# Patient Record
Sex: MALE | Race: WHITE | NOT HISPANIC OR LATINO | Employment: FULL TIME | URBAN - METROPOLITAN AREA
[De-identification: names, ages, dates, MRNs, and addresses within clinical notes are randomized per-mention and may not be internally consistent; named-entity substitution may affect disease eponyms.]

---

## 2021-07-01 ENCOUNTER — PRE-ADMISSION TESTING (OUTPATIENT)
Dept: PREADMISSION TESTING | Facility: HOSPITAL | Age: 51
End: 2021-07-01

## 2021-07-01 VITALS — WEIGHT: 276.6 LBS | BODY MASS INDEX: 37.46 KG/M2 | HEIGHT: 72 IN

## 2021-07-01 LAB
ALBUMIN SERPL-MCNC: 4.4 G/DL (ref 3.5–5.2)
ALBUMIN/GLOB SERPL: 1.6 G/DL
ALP SERPL-CCNC: 51 U/L (ref 39–117)
ALT SERPL W P-5'-P-CCNC: 55 U/L (ref 1–41)
ANION GAP SERPL CALCULATED.3IONS-SCNC: 10 MMOL/L (ref 5–15)
APTT PPP: 26 SECONDS (ref 22–39)
AST SERPL-CCNC: 50 U/L (ref 1–40)
BASOPHILS # BLD AUTO: 0.05 10*3/MM3 (ref 0–0.2)
BASOPHILS NFR BLD AUTO: 0.7 % (ref 0–1.5)
BILIRUB SERPL-MCNC: 0.5 MG/DL (ref 0–1.2)
BUN SERPL-MCNC: 15 MG/DL (ref 6–20)
BUN/CREAT SERPL: 15.3 (ref 7–25)
CALCIUM SPEC-SCNC: 9.4 MG/DL (ref 8.6–10.5)
CHLORIDE SERPL-SCNC: 102 MMOL/L (ref 98–107)
CO2 SERPL-SCNC: 26 MMOL/L (ref 22–29)
CREAT SERPL-MCNC: 0.98 MG/DL (ref 0.76–1.27)
CRP SERPL-MCNC: <0.3 MG/DL (ref 0–0.5)
DEPRECATED RDW RBC AUTO: 41.7 FL (ref 37–54)
EOSINOPHIL # BLD AUTO: 0.07 10*3/MM3 (ref 0–0.4)
EOSINOPHIL NFR BLD AUTO: 1 % (ref 0.3–6.2)
ERYTHROCYTE [DISTWIDTH] IN BLOOD BY AUTOMATED COUNT: 12.1 % (ref 12.3–15.4)
ERYTHROCYTE [SEDIMENTATION RATE] IN BLOOD: 5 MM/HR (ref 0–15)
GFR SERPL CREATININE-BSD FRML MDRD: 81 ML/MIN/1.73
GLOBULIN UR ELPH-MCNC: 2.7 GM/DL
GLUCOSE SERPL-MCNC: 92 MG/DL (ref 65–99)
HBA1C MFR BLD: 5 % (ref 4.8–5.6)
HCT VFR BLD AUTO: 44 % (ref 37.5–51)
HGB BLD-MCNC: 15.6 G/DL (ref 13–17.7)
IMM GRANULOCYTES # BLD AUTO: 0.06 10*3/MM3 (ref 0–0.05)
IMM GRANULOCYTES NFR BLD AUTO: 0.8 % (ref 0–0.5)
INR PPP: 0.96 (ref 0.85–1.16)
LYMPHOCYTES # BLD AUTO: 2.34 10*3/MM3 (ref 0.7–3.1)
LYMPHOCYTES NFR BLD AUTO: 32 % (ref 19.6–45.3)
MCH RBC QN AUTO: 33.8 PG (ref 26.6–33)
MCHC RBC AUTO-ENTMCNC: 35.5 G/DL (ref 31.5–35.7)
MCV RBC AUTO: 95.4 FL (ref 79–97)
MONOCYTES # BLD AUTO: 0.96 10*3/MM3 (ref 0.1–0.9)
MONOCYTES NFR BLD AUTO: 13.1 % (ref 5–12)
NEUTROPHILS NFR BLD AUTO: 3.83 10*3/MM3 (ref 1.7–7)
NEUTROPHILS NFR BLD AUTO: 52.4 % (ref 42.7–76)
NRBC BLD AUTO-RTO: 0 /100 WBC (ref 0–0.2)
PLATELET # BLD AUTO: 211 10*3/MM3 (ref 140–450)
PMV BLD AUTO: 8.9 FL (ref 6–12)
POTASSIUM SERPL-SCNC: 4.2 MMOL/L (ref 3.5–5.2)
PROT SERPL-MCNC: 7.1 G/DL (ref 6–8.5)
PROTHROMBIN TIME: 12.5 SECONDS (ref 11.4–14.4)
QT INTERVAL: 406 MS
QTC INTERVAL: 422 MS
RBC # BLD AUTO: 4.61 10*6/MM3 (ref 4.14–5.8)
SODIUM SERPL-SCNC: 138 MMOL/L (ref 136–145)
WBC # BLD AUTO: 7.31 10*3/MM3 (ref 3.4–10.8)

## 2021-07-01 PROCEDURE — 85730 THROMBOPLASTIN TIME PARTIAL: CPT

## 2021-07-01 PROCEDURE — 82985 ASSAY OF GLYCATED PROTEIN: CPT

## 2021-07-01 PROCEDURE — 93010 ELECTROCARDIOGRAM REPORT: CPT | Performed by: INTERNAL MEDICINE

## 2021-07-01 PROCEDURE — 86140 C-REACTIVE PROTEIN: CPT

## 2021-07-01 PROCEDURE — G0480 DRUG TEST DEF 1-7 CLASSES: HCPCS

## 2021-07-01 PROCEDURE — 85610 PROTHROMBIN TIME: CPT

## 2021-07-01 PROCEDURE — 85025 COMPLETE CBC W/AUTO DIFF WBC: CPT

## 2021-07-01 PROCEDURE — 83036 HEMOGLOBIN GLYCOSYLATED A1C: CPT

## 2021-07-01 PROCEDURE — 80053 COMPREHEN METABOLIC PANEL: CPT

## 2021-07-01 PROCEDURE — 87081 CULTURE SCREEN ONLY: CPT

## 2021-07-01 PROCEDURE — 85652 RBC SED RATE AUTOMATED: CPT

## 2021-07-01 PROCEDURE — 82652 VIT D 1 25-DIHYDROXY: CPT

## 2021-07-01 PROCEDURE — 36415 COLL VENOUS BLD VENIPUNCTURE: CPT

## 2021-07-01 PROCEDURE — 93005 ELECTROCARDIOGRAM TRACING: CPT

## 2021-07-01 RX ORDER — ASPIRIN 81 MG/1
81 TABLET, CHEWABLE ORAL DAILY
COMMUNITY

## 2021-07-01 RX ORDER — MELOXICAM 15 MG/1
15 TABLET ORAL DAILY
COMMUNITY

## 2021-07-01 RX ORDER — CETIRIZINE HYDROCHLORIDE 10 MG/1
10 TABLET ORAL DAILY
COMMUNITY

## 2021-07-01 RX ORDER — LISINOPRIL 20 MG/1
20 TABLET ORAL DAILY
COMMUNITY

## 2021-07-01 RX ORDER — ESCITALOPRAM OXALATE 10 MG/1
10 TABLET ORAL DAILY
COMMUNITY

## 2021-07-01 NOTE — DISCHARGE INSTRUCTIONS
Verified with patient that they received a prescription for Bactroban from the office.  Reinforced with them to fill the prescription if they haven't already.  Verbal and written instructions given regarding proper use of the Bactroban to patient and/or famlily during PAT visit. Patient/family also instructed to complete checklist and return it to Pre-op on the day of surgery.  Patient and/or family verbalized understanding.    Per Anesthesia Request, patient instructed not to take their ACE/ARB medications on the AM of surgery.    Patient instructed to drink 20 ounces (or until full) of Gatorade and it needs to be completed 1 hour before given arrival time for procedure (NO RED Gatorade)    Patient verbalized understanding.    Patient to apply Chlorhexadine wipes  to surgical area (as instructed) the night before procedure and the AM of procedure. Wipes provided.    The following information and instructions were given:    Do not eat, drink, smoke or chew gum after midnight the night before surgery. This includes no mints.  Take all routine, prescribed medications including heart and blood pressure medicines with a sip of water unless otherwise instructed by your physician.   Do NOT take diabetic medication unless instructed by your physician.    DO NOT shave for two days before your procedure.  Do not wear makeup.      DO NOT wear fingernail polish (gel/regular) and/or acrylic/artificial nails on the day of surgery.   If you had a recent manicure and would rather not remove polish or artificial nails, the minimum requirement is that the polish/artificial nails must be removed from the middle finger on each hand.      If you are having surgery/procedure on an upper extremity, fingernail polish/artificial fingernails must be removed for surgery.  NO EXCEPTIONS.      If you are having surgery/procedure on a lower extremity, toenail polish on both extremities must be removed for surgery.  NO EXCEPTIONS.    Remove all  jewelry (advise to go to jeweler if unable to remove).  Jewelry, especially rings, can no longer be taped for surgery.    Leave anything you consider valuable at home.    Leave your suitcase in the car until after your surgery.    Bring the following with you the day of your procedure (when applicable):       -Picture ID and insurance cards       -Co-pay/deductible required by insurance       -Medications in the original bottles (not a list) including all over-the-counter medications if not brought to PAT       -Copy of advance directive, living will or power of  documents if not brought to PAT       -CPAP or BIPAP mask and tubing (do not bring machine)       -Skin prep instruction(s) sheet       -PAT Pass    Education booklet, brochure, handout or binder related to procedure given to patient.  Education booklet also includes general information related to their recovery that mentions signs and symptoms of infection and when to call the doctor.    When applicable, an ERAS handout/booklet was given to patient.    Pain Control After Surgery handout given to patient.    Respirex use (handout given to patient) and pneumonia prevention education provided.    Signs and Symptoms of infection discussed with patient in Pre Admission Testing.  Patient instructed to call their doctor if any of the following symptoms are noted during recovery:  Fever of 100.4 F or higher, incision that is warm or has increasing bleeding, redness or drainage.    DVT Prevention instructions given verbally during Pre Admission Testing appointment that stress the importance of ambulation to improve blood circulation.  Also encouraged patient to perform foot exercises when in bed and application of a sequential device may be applied to lower extremities to improve circulation.

## 2021-07-01 NOTE — PAT
Verified with patient that they received a prescription for Bactroban from the office.  Reinforced with them to fill the prescription if they haven't already.  Verbal and written instructions given regarding proper use of the Bactroban to patient and/or famlily during PAT visit. Patient/family also instructed to complete checklist and return it to Pre-op on the day of surgery.  Patient and/or family verbalized understanding.    Per Anesthesia Request, patient instructed not to take their ACE/ARB medications on the AM of surgery.    Patient instructed to drink 20 ounces (or until full) of Gatorade and it needs to be completed 1 hour before given arrival time for procedure (NO RED Gatorade)    Patient verbalized understanding.    Patient to apply Chlorhexadine wipes  to surgical area (as instructed) the night before procedure and the AM of procedure. Wipes provided.    An arrival time for procedure was not given during PAT visit. If patient had any questions or concerns about their arrival time, they were instructed to contact their surgeon/physician.  Additionally, if the patient referred to an arrival time that was acquired from their my chart account, patient was encouraged to verify that time with their surgeon/physician.  NO arrival times given in Pre Admission Testing Department.

## 2021-07-02 LAB
FRUCTOSAMINE SERPL-SCNC: 219 UMOL/L (ref 0–285)
MRSA SPEC QL CULT: NORMAL

## 2021-07-06 ENCOUNTER — APPOINTMENT (OUTPATIENT)
Dept: PREADMISSION TESTING | Facility: HOSPITAL | Age: 51
End: 2021-07-06

## 2021-07-06 ENCOUNTER — HOSPITAL ENCOUNTER (OUTPATIENT)
Dept: GENERAL RADIOLOGY | Facility: HOSPITAL | Age: 51
Discharge: HOME | End: 2021-07-06

## 2021-07-06 LAB
1,25(OH)2D SERPL-MCNC: 39.4 PG/ML (ref 19.9–79.3)
SARS-COV-2 RNA PNL SPEC NAA+PROBE: NOT DETECTED

## 2021-07-06 PROCEDURE — U0004 COV-19 TEST NON-CDC HGH THRU: HCPCS

## 2021-07-06 PROCEDURE — C9803 HOPD COVID-19 SPEC COLLECT: HCPCS

## 2021-07-06 PROCEDURE — 71046 X-RAY EXAM CHEST 2 VIEWS: CPT

## 2021-07-07 ENCOUNTER — ANESTHESIA EVENT (OUTPATIENT)
Dept: PERIOP | Facility: HOSPITAL | Age: 51
End: 2021-07-07

## 2021-07-08 ENCOUNTER — ANESTHESIA (OUTPATIENT)
Dept: PERIOP | Facility: HOSPITAL | Age: 51
End: 2021-07-08

## 2021-07-08 ENCOUNTER — HOSPITAL ENCOUNTER (OUTPATIENT)
Facility: HOSPITAL | Age: 51
Setting detail: HOSPITAL OUTPATIENT SURGERY
Discharge: HOME | End: 2021-07-08
Attending: ORTHOPAEDIC SURGERY | Admitting: ORTHOPAEDIC SURGERY
Payer: COMMERCIAL

## 2021-07-08 VITALS
TEMPERATURE: 97.2 F | RESPIRATION RATE: 16 BRPM | OXYGEN SATURATION: 96 % | HEART RATE: 65 BPM | DIASTOLIC BLOOD PRESSURE: 97 MMHG | SYSTOLIC BLOOD PRESSURE: 155 MMHG

## 2021-07-08 DIAGNOSIS — S76.012A TEAR OF LEFT GLUTEUS MEDIUS TENDON, INITIAL ENCOUNTER: ICD-10-CM

## 2021-07-08 DIAGNOSIS — R26.89 IMPAIRED GAIT AND MOBILITY: ICD-10-CM

## 2021-07-08 DIAGNOSIS — S76.019A TEAR OF GLUTEUS MEDIUS TENDON, UNSPECIFIED LATERALITY, INITIAL ENCOUNTER: Primary | ICD-10-CM

## 2021-07-08 PROCEDURE — 25010000003 LIDOCAINE 1 % SOLUTION: Performed by: NURSE ANESTHETIST, CERTIFIED REGISTERED

## 2021-07-08 PROCEDURE — C1889 IMPLANT/INSERT DEVICE, NOC: HCPCS | Performed by: ORTHOPAEDIC SURGERY

## 2021-07-08 PROCEDURE — 25010000002 CLONIDINE PER 1 MG: Performed by: ORTHOPAEDIC SURGERY

## 2021-07-08 PROCEDURE — 25010000002 KETOROLAC TROMETHAMINE PER 15 MG: Performed by: ORTHOPAEDIC SURGERY

## 2021-07-08 PROCEDURE — 97161 PT EVAL LOW COMPLEX 20 MIN: CPT

## 2021-07-08 PROCEDURE — 25010000002 PROPOFOL 10 MG/ML EMULSION: Performed by: NURSE ANESTHETIST, CERTIFIED REGISTERED

## 2021-07-08 PROCEDURE — 25010000002 NEOSTIGMINE 10 MG/10ML SOLUTION: Performed by: NURSE ANESTHETIST, CERTIFIED REGISTERED

## 2021-07-08 PROCEDURE — C1713 ANCHOR/SCREW BN/BN,TIS/BN: HCPCS | Performed by: ORTHOPAEDIC SURGERY

## 2021-07-08 PROCEDURE — 25010000002 DEXAMETHASONE PER 1 MG: Performed by: NURSE ANESTHETIST, CERTIFIED REGISTERED

## 2021-07-08 PROCEDURE — 25010000002 FENTANYL CITRATE (PF) 50 MCG/ML SOLUTION: Performed by: NURSE ANESTHETIST, CERTIFIED REGISTERED

## 2021-07-08 DEVICE — TISS PTCH DECEL/DERM ARTHROFLEX 40X70X3MM: Type: IMPLANTABLE DEVICE | Site: HIP | Status: FUNCTIONAL

## 2021-07-08 DEVICE — DEV CONTRL TISS STRATAFIX SPIRAL PDO BIDIR 1 36X36CM: Type: IMPLANTABLE DEVICE | Site: HIP | Status: FUNCTIONAL

## 2021-07-08 DEVICE — IMPLANTABLE DEVICE: Type: IMPLANTABLE DEVICE | Site: HIP | Status: FUNCTIONAL

## 2021-07-08 DEVICE — SYS IMP ACL SWIVELOCK 2NDARY/FIX SUTANCH/BIOCOMP 4.75X19.1MM: Type: IMPLANTABLE DEVICE | Site: HIP | Status: FUNCTIONAL

## 2021-07-08 DEVICE — SUT FW #2 W/TPR NDL 1/2 CIR 38IN 97CM 26.5MM BLU: Type: IMPLANTABLE DEVICE | Site: HIP | Status: FUNCTIONAL

## 2021-07-08 RX ORDER — TRANEXAMIC ACID 10 MG/ML
1000 INJECTION, SOLUTION INTRAVENOUS ONCE
Status: DISCONTINUED | OUTPATIENT
Start: 2021-07-08 | End: 2021-07-08 | Stop reason: HOSPADM

## 2021-07-08 RX ORDER — TRANEXAMIC ACID 10 MG/ML
1000 INJECTION, SOLUTION INTRAVENOUS ONCE
Status: COMPLETED | OUTPATIENT
Start: 2021-07-08 | End: 2021-07-08

## 2021-07-08 RX ORDER — CEFAZOLIN SODIUM IN 0.9 % NACL 3 G/100 ML
3 INTRAVENOUS SOLUTION, PIGGYBACK (ML) INTRAVENOUS ONCE
Status: COMPLETED | OUTPATIENT
Start: 2021-07-08 | End: 2021-07-08

## 2021-07-08 RX ORDER — MIDAZOLAM HYDROCHLORIDE 1 MG/ML
1 INJECTION INTRAMUSCULAR; INTRAVENOUS
Status: DISCONTINUED | OUTPATIENT
Start: 2021-07-08 | End: 2021-07-08 | Stop reason: HOSPADM

## 2021-07-08 RX ORDER — FENTANYL CITRATE 50 UG/ML
INJECTION, SOLUTION INTRAMUSCULAR; INTRAVENOUS AS NEEDED
Status: DISCONTINUED | OUTPATIENT
Start: 2021-07-08 | End: 2021-07-08 | Stop reason: SURG

## 2021-07-08 RX ORDER — GLYCOPYRROLATE 0.2 MG/ML
INJECTION INTRAMUSCULAR; INTRAVENOUS AS NEEDED
Status: DISCONTINUED | OUTPATIENT
Start: 2021-07-08 | End: 2021-07-08 | Stop reason: SURG

## 2021-07-08 RX ORDER — NEOSTIGMINE METHYLSULFATE 1 MG/ML
INJECTION, SOLUTION INTRAVENOUS AS NEEDED
Status: DISCONTINUED | OUTPATIENT
Start: 2021-07-08 | End: 2021-07-08 | Stop reason: SURG

## 2021-07-08 RX ORDER — PROMETHAZINE HYDROCHLORIDE 25 MG/1
25 TABLET ORAL ONCE AS NEEDED
Status: DISCONTINUED | OUTPATIENT
Start: 2021-07-08 | End: 2021-07-08 | Stop reason: HOSPADM

## 2021-07-08 RX ORDER — FAMOTIDINE 20 MG/1
20 TABLET, FILM COATED ORAL ONCE
Status: COMPLETED | OUTPATIENT
Start: 2021-07-08 | End: 2021-07-08

## 2021-07-08 RX ORDER — HYDROMORPHONE HYDROCHLORIDE 1 MG/ML
0.5 INJECTION, SOLUTION INTRAMUSCULAR; INTRAVENOUS; SUBCUTANEOUS
Status: DISCONTINUED | OUTPATIENT
Start: 2021-07-08 | End: 2021-07-08 | Stop reason: HOSPADM

## 2021-07-08 RX ORDER — PREGABALIN 75 MG/1
75 CAPSULE ORAL ONCE
Status: COMPLETED | OUTPATIENT
Start: 2021-07-08 | End: 2021-07-08

## 2021-07-08 RX ORDER — MAGNESIUM HYDROXIDE 1200 MG/15ML
LIQUID ORAL AS NEEDED
Status: DISCONTINUED | OUTPATIENT
Start: 2021-07-08 | End: 2021-07-08 | Stop reason: HOSPADM

## 2021-07-08 RX ORDER — ESMOLOL HYDROCHLORIDE 10 MG/ML
INJECTION INTRAVENOUS AS NEEDED
Status: DISCONTINUED | OUTPATIENT
Start: 2021-07-08 | End: 2021-07-08 | Stop reason: SURG

## 2021-07-08 RX ORDER — LIDOCAINE HYDROCHLORIDE 10 MG/ML
INJECTION, SOLUTION INFILTRATION; PERINEURAL AS NEEDED
Status: DISCONTINUED | OUTPATIENT
Start: 2021-07-08 | End: 2021-07-08 | Stop reason: SURG

## 2021-07-08 RX ORDER — SODIUM CHLORIDE 0.9 % (FLUSH) 0.9 %
10 SYRINGE (ML) INJECTION EVERY 12 HOURS SCHEDULED
Status: DISCONTINUED | OUTPATIENT
Start: 2021-07-08 | End: 2021-07-08 | Stop reason: HOSPADM

## 2021-07-08 RX ORDER — PROPOFOL 10 MG/ML
VIAL (ML) INTRAVENOUS AS NEEDED
Status: DISCONTINUED | OUTPATIENT
Start: 2021-07-08 | End: 2021-07-08 | Stop reason: SURG

## 2021-07-08 RX ORDER — FENTANYL CITRATE 50 UG/ML
50 INJECTION, SOLUTION INTRAMUSCULAR; INTRAVENOUS
Status: DISCONTINUED | OUTPATIENT
Start: 2021-07-08 | End: 2021-07-08 | Stop reason: HOSPADM

## 2021-07-08 RX ORDER — ACETAMINOPHEN 500 MG
1000 TABLET ORAL ONCE
Status: COMPLETED | OUTPATIENT
Start: 2021-07-08 | End: 2021-07-08

## 2021-07-08 RX ORDER — SODIUM CHLORIDE, SODIUM LACTATE, POTASSIUM CHLORIDE, CALCIUM CHLORIDE 600; 310; 30; 20 MG/100ML; MG/100ML; MG/100ML; MG/100ML
9 INJECTION, SOLUTION INTRAVENOUS CONTINUOUS
Status: DISCONTINUED | OUTPATIENT
Start: 2021-07-08 | End: 2021-07-08 | Stop reason: HOSPADM

## 2021-07-08 RX ORDER — OXYCODONE HYDROCHLORIDE AND ACETAMINOPHEN 5; 325 MG/1; MG/1
1 TABLET ORAL ONCE AS NEEDED
Status: COMPLETED | OUTPATIENT
Start: 2021-07-08 | End: 2021-07-08

## 2021-07-08 RX ORDER — FAMOTIDINE 10 MG/ML
20 INJECTION, SOLUTION INTRAVENOUS ONCE
Status: DISCONTINUED | OUTPATIENT
Start: 2021-07-08 | End: 2021-07-08 | Stop reason: HOSPADM

## 2021-07-08 RX ORDER — EPHEDRINE SULFATE 50 MG/ML
5 INJECTION, SOLUTION INTRAVENOUS ONCE AS NEEDED
Status: DISCONTINUED | OUTPATIENT
Start: 2021-07-08 | End: 2021-07-08 | Stop reason: HOSPADM

## 2021-07-08 RX ORDER — PROMETHAZINE HYDROCHLORIDE 25 MG/1
25 SUPPOSITORY RECTAL ONCE AS NEEDED
Status: DISCONTINUED | OUTPATIENT
Start: 2021-07-08 | End: 2021-07-08 | Stop reason: HOSPADM

## 2021-07-08 RX ORDER — SODIUM CHLORIDE 0.9 % (FLUSH) 0.9 %
10 SYRINGE (ML) INJECTION AS NEEDED
Status: DISCONTINUED | OUTPATIENT
Start: 2021-07-08 | End: 2021-07-08 | Stop reason: HOSPADM

## 2021-07-08 RX ORDER — LIDOCAINE HYDROCHLORIDE 10 MG/ML
0.5 INJECTION, SOLUTION EPIDURAL; INFILTRATION; INTRACAUDAL; PERINEURAL ONCE AS NEEDED
Status: COMPLETED | OUTPATIENT
Start: 2021-07-08 | End: 2021-07-08

## 2021-07-08 RX ORDER — DEXAMETHASONE SODIUM PHOSPHATE 4 MG/ML
INJECTION, SOLUTION INTRA-ARTICULAR; INTRALESIONAL; INTRAMUSCULAR; INTRAVENOUS; SOFT TISSUE AS NEEDED
Status: DISCONTINUED | OUTPATIENT
Start: 2021-07-08 | End: 2021-07-08 | Stop reason: SURG

## 2021-07-08 RX ORDER — MELOXICAM 15 MG/1
15 TABLET ORAL ONCE
Status: COMPLETED | OUTPATIENT
Start: 2021-07-08 | End: 2021-07-08

## 2021-07-08 RX ORDER — ROCURONIUM BROMIDE 10 MG/ML
INJECTION, SOLUTION INTRAVENOUS AS NEEDED
Status: DISCONTINUED | OUTPATIENT
Start: 2021-07-08 | End: 2021-07-08 | Stop reason: SURG

## 2021-07-08 RX ADMIN — Medication 3 G: at 08:49

## 2021-07-08 RX ADMIN — LIDOCAINE HYDROCHLORIDE 50 MG: 10 INJECTION, SOLUTION INFILTRATION; PERINEURAL at 08:54

## 2021-07-08 RX ADMIN — PROPOFOL 200 MG: 10 INJECTION, EMULSION INTRAVENOUS at 08:54

## 2021-07-08 RX ADMIN — SODIUM CHLORIDE, POTASSIUM CHLORIDE, SODIUM LACTATE AND CALCIUM CHLORIDE 9 ML/HR: 600; 310; 30; 20 INJECTION, SOLUTION INTRAVENOUS at 08:19

## 2021-07-08 RX ADMIN — MELOXICAM 15 MG: 15 TABLET ORAL at 08:19

## 2021-07-08 RX ADMIN — TRANEXAMIC ACID 1000 MG: 10 INJECTION, SOLUTION INTRAVENOUS at 09:56

## 2021-07-08 RX ADMIN — ROCURONIUM BROMIDE 50 MG: 10 INJECTION, SOLUTION INTRAVENOUS at 08:54

## 2021-07-08 RX ADMIN — NEOSTIGMINE 4 MG: 1 INJECTION INTRAVENOUS at 10:21

## 2021-07-08 RX ADMIN — ROCURONIUM BROMIDE 30 MG: 10 INJECTION, SOLUTION INTRAVENOUS at 09:21

## 2021-07-08 RX ADMIN — FAMOTIDINE 20 MG: 20 TABLET, FILM COATED ORAL at 08:19

## 2021-07-08 RX ADMIN — GLYCOPYRROLATE 0.4 MG: 0.4 INJECTION INTRAMUSCULAR; INTRAVENOUS at 10:21

## 2021-07-08 RX ADMIN — DEXAMETHASONE SODIUM PHOSPHATE 8 MG: 4 INJECTION, SOLUTION INTRA-ARTICULAR; INTRALESIONAL; INTRAMUSCULAR; INTRAVENOUS; SOFT TISSUE at 08:54

## 2021-07-08 RX ADMIN — PREGABALIN 75 MG: 75 CAPSULE ORAL at 08:19

## 2021-07-08 RX ADMIN — ACETAMINOPHEN 1000 MG: 500 TABLET, FILM COATED ORAL at 08:19

## 2021-07-08 RX ADMIN — FENTANYL CITRATE 100 MCG: 50 INJECTION, SOLUTION INTRAMUSCULAR; INTRAVENOUS at 08:54

## 2021-07-08 RX ADMIN — MUPIROCIN: 20 OINTMENT TOPICAL at 08:20

## 2021-07-08 RX ADMIN — LIDOCAINE HYDROCHLORIDE 0.5 ML: 10 INJECTION, SOLUTION EPIDURAL; INFILTRATION; INTRACAUDAL; PERINEURAL at 08:19

## 2021-07-08 RX ADMIN — ESMOLOL HYDROCHLORIDE 100 MG: 10 INJECTION, SOLUTION INTRAVENOUS at 08:54

## 2021-07-08 RX ADMIN — OXYCODONE HYDROCHLORIDE AND ACETAMINOPHEN 1 TABLET: 5; 325 TABLET ORAL at 13:22

## 2021-07-08 NOTE — ANESTHESIA PROCEDURE NOTES
Airway  Urgency: elective    Date/Time: 7/8/2021 8:57 AM  Airway not difficult    General Information and Staff    Patient location during procedure: OR  CRNA: Clifton Steve CRNA    Indications and Patient Condition  Indications for airway management: airway protection    Preoxygenated: yes  MILS not maintained throughout  Mask difficulty assessment: 1 - vent by mask    Final Airway Details  Final airway type: endotracheal airway      Successful airway: ETT  Cuffed: yes   Successful intubation technique: direct laryngoscopy  Endotracheal tube insertion site: oral  Blade: Tamera  Blade size: 3  ETT size (mm): 7.0  Cormack-Lehane Classification: grade I - full view of glottis  Placement verified by: chest auscultation and capnometry   Cuff volume (mL): 8  Measured from: lips  ETT/EBT  to lips (cm): 20  Number of attempts at approach: 1  Assessment: lips, teeth, and gum same as pre-op and atraumatic intubation    Additional Comments  Negative epigastric sounds, Breath sound equal bilaterally with symmetric chest rise and fall

## 2021-07-08 NOTE — ANESTHESIA POSTPROCEDURE EVALUATION
Patient: Caleb Geronimo    Procedure Summary     Date: 07/08/21 Room / Location:  [facility]  /  [facility]    Anesthesia Start: 0849 Anesthesia Stop: 1027    Procedure: HIP ABDUCTOR REPAIR LEFT (Left Hip) Diagnosis:       Tendinitis involving left hip abductors      (Tendinitis involving left hip abductors [[id number]])    Surgeons: Kolton Kurtz MD Provider: Jean Quinones MD    Anesthesia Type: spinal ASA Status: 3          Anesthesia Type: spinal    Vitals  No vitals data found for the desired time range.          Post Anesthesia Care and Evaluation    Patient location during evaluation: PACU  Patient participation: complete - patient participated  Level of consciousness: awake and alert  Pain management: adequate  Airway patency: patent  Anesthetic complications: No anesthetic complications  PONV Status: none  Cardiovascular status: hemodynamically stable and acceptable  Respiratory status: nonlabored ventilation, acceptable and nasal cannula  Hydration status: acceptable

## 2021-07-08 NOTE — PLAN OF CARE
Problem: Adult Inpatient Plan of Care  Goal: Plan of Care Review  Flowsheets (Taken 7/8/2021 1309)  Progress: improving  Plan of Care Reviewed With: patient  Outcome Summary: PT eval complete. Pt ambulated 150 feet using RW, SBA, and one person to manage equipment. Pt ascended/descended 1 step using RW, backwards technique, and SBA. Gait/stair training limited by fatigue. Bed mobility performed with supervision and STS with SBA. Pt able to maintain TTWB precautions at L LE throughout mobility. Reviewed WB precautions. Functionally, pt safe to d/c home with assist today from a PT perspective.   Goal Outcome Evaluation:  Plan of Care Reviewed With: patient        Progress: improving  Outcome Summary: PT eval complete. Pt ambulated 150 feet using RW, SBA, and one person to manage equipment. Pt ascended/descended 1 step using RW, backwards technique, and SBA. Gait/stair training limited by fatigue. Bed mobility performed with supervision and STS with SBA. Pt able to maintain TTWB precautions at L LE throughout mobility. Reviewed WB precautions. Functionally, pt safe to d/c home with assist today from a PT perspective.

## 2021-07-08 NOTE — ANESTHESIA PREPROCEDURE EVALUATION
Anesthesia Evaluation                  Airway   Mallampati: I  TM distance: >3 FB  Neck ROM: full  No difficulty expected  Dental      Pulmonary    Cardiovascular     ECG reviewed    (+) hypertension,       Neuro/Psych  (+) psychiatric history Depression,     GI/Hepatic/Renal/Endo    (+) obesity,       Musculoskeletal     Abdominal    Substance History      OB/GYN          Other                        Anesthesia Plan    ASA 3     spinal     intravenous induction     Anesthetic plan, all risks, benefits, and alternatives have been provided, discussed and informed consent has been obtained with: patient.    Plan discussed with CRNA.

## 2021-07-08 NOTE — INTERVAL H&P NOTE
[facility] Pre-op    Full history and physical note from office is attached.    VS: /98  HR 70  RR 16  T 97.5  Sat 95%RA    Immunizations:  Influenza:  No  Pneumococcal:  No  Tetanus:  UTD  Covid : No    LAB Results:  Lab Results   Component Value Date    WBC 7.31 07/01/2021    HGB 15.6 07/01/2021    HCT 44.0 07/01/2021    MCV 95.4 07/01/2021     07/01/2021    NEUTROABS 3.83 07/01/2021    GLUCOSE 92 07/01/2021    BUN 15 07/01/2021    CREATININE 0.98 07/01/2021    EGFRIFNONA 81 07/01/2021     07/01/2021    K 4.2 07/01/2021     07/01/2021    CO2 26.0 07/01/2021    CALCIUM 9.4 07/01/2021    ALBUMIN 4.40 07/01/2021    AST 50 (H) 07/01/2021    ALT 55 (H) 07/01/2021    BILITOT 0.5 07/01/2021    PTT 26.0 07/01/2021    INR 0.96 07/01/2021       Cancer Staging (if applicable)  Cancer Patient: __ yes __no __unknown__N/A; If yes, clinical stage T:__ N:__M:__, stage group or __N/A      Impression: left partial tear gluten medius tendon with mild retraction       Plan: HIP ABDUCTOR REPAIR LEFT      ROBERT Herman   7/8/2021   08:03 EDT

## 2021-07-08 NOTE — CASE MANAGEMENT/SOCIAL WORK
Continued Stay Note  [facility]     Patient Name: Caleb Geronimo  MRN: [id number]  Today's Date: 7/8/2021    Admit Date: 7/8/2021    Discharge Plan     Row Name 07/08/21 1223       Plan    Plan  walker ordered    Patient/Family in Agreement with Plan  yes    Plan Comments  SW has ordered pt's walker, ordered by Dr. Kurtz, through [employer] (formerly [employer]).  The walker will be delivered to pt's recovery room before he is discharged.        Discharge Codes    No documentation.       Expected Discharge Date and Time     Expected Discharge Date Expected Discharge Time    Jul 8, 2021             BRONWYN Mireles

## 2021-07-08 NOTE — DISCHARGE INSTRUCTIONS
[facility] INC[facility] CARE Primary Hip and Knee:  1. You have a sterile dressing in place. Only exchange the dressing if it becomes saturated (fluid draining out the sides of dressing) and notify the office. The dressing is water resistant. During the first 7 days after surgery, it is ok to shower. DO NOT scrub on or around the dressing. Do not submerge the dressing.  2. After 7 days, you can remove your dressing. Your sutures are all underneath your skin. There is a layer of glue over the incision. DO NOT pick at this or try to peal it off. If the edges do peel up it is ok to trim them as needed. It is OK to shower with the incision exposed. DO NOT scrub on or around the incision. DO NOT submerge the incision in pools, baths, or hot tubs for 1 month after surgery.  3. No creams or ointments to the incision for 1 month after surgery. After 1 month, it is recommended to massage the scar with vitamin E cream to help decrease scar formation.  4. Check incision every day and notify surgeon immediately if any of the following signs or symptoms are noted:  o Increase in redness  o Increase in swelling around the incision and of the entire extremity  o Increase in pain  o Drainage oozing from the incision  o Pulling apart of the edges of the incision  o Increase in overall body temperature (greater than 100.5 degrees)    Anticoagulants: You will be discharged on an anticoagulant. This is a prophylactic medication that helps prevent blood clots during your post-operative period. Most patients will be on ***Aspirin 81 mg Enteric coated every 12 hours orally for 30 days. Some patients, due to increased risk factors, will need to be on a stronger anticoagulant. Dr. Kurtz will discuss this need with you.   ? While taking the anticoagulant, you should avoid taking any additional aspirin, and limit ibuprofen (Advil or Motrin), Aleve (Naprosyn) or other non-steroidal anti-inflammatory medications.   ? Notify your surgeon immediately if  any nathen bleeding is noted in the urine, stool, vomit, or from the nose or the incision. Blood in the stool will often appear as black rather than red. Blood in urine may appear as pink. Blood in vomit may appear as brown/black like coffee grounds.  ? You will need to apply pressure for longer periods of time to any cuts or abrasions to stop bleeding  ? Avoid alcohol while taking anticoagulants  Sequential Compression Device: You maybe be discharged home with a compression device that helps promote blood flow and prevent clots in your legs. Wear these at all times for the first two weeks.   Mobilization: The best way to avoid a blood clot is to get up and walk. 10 times a day get up and walk for 5 minutes for the first two weeks. Walking for longer periods of time will increase pain and swelling, making therapy more difficult. If taking any long travel (car or plane) in the first 1-2 months, be sure to get up and walk at least every one hour.     Stool Softeners: You will be at greater risk of constipation after surgery because of being less mobile and taking the pain medications.   ? Take stool softeners as instructed by your surgeon while on pain medications. Use over the counter Colace 100 mg 1-2 capsules twice daily.   ? If stools become too loose or too frequent, decreases the dosage or stop the stool softener.  ? If constipation occurs despite use of stool softeners, you are to continue the stool softeners and add a laxative (Milk of Magnesia 1 ounce daily as needed).  ? Dulcolax oral tabs or suppository or a fleets enema can also be utilized for constipation and can be obtained over the counter.   ? If above interventions are unsuccessful in inducing bowel movements, please contact your family physician's office/surgeon's office.  ? Drink plenty of fluids and eat fruits and vegetables during your recovery time    Pain Medications utilized after surgery are narcotics and the law requires that the following  information be given to all patients that are prescribed narcotics:  ? CLASSIFICATION: Pain medications are called Opioids and are narcotics  ? LEGALITIES: It is illegal to share narcotics with others and to drive within 24 hours of taking narcotics  ? POTENTIAL SIDE EFFECTS: Potential side effects of opioids include: nausea, vomiting, itching, dizziness, drowsiness, dry mouth, constipation, and difficulty urinating.  ? POTENTIAL ADVERSE EFFECTS:   o Opioid tolerance can develop with use of pain medications and this simply means that it requires more and more of the medication to control pain; however, this is seen more in patients that use Opioids for longer periods of time.  o Opioid dependence can develop with use of Opioids and this simply means that to stop the medication can cause withdrawal symptoms; however, this is seen with patients that use Opioids for longer periods of time.  o Opioid addiction can develop with use of Opioids and the incidence of this is very unlikely in patients who take the medications as ordered and stop the medications as instructed.  o Opioid overdose can be dangerous, but is unlikely when the medication is taken as ordered and stopped when ordered. It is important not to mix opioids with alcohol or with any type of sedative, such as Benadryl, as this can lead to over sedation and respiratory difficulty.  ? DOSAGE:   o Pain medications may be needed consistently for the first week to decrease pain and promote adequate pain relief and participation in physical therapy.  o After the initial surgical pain begins to resolve, you may begin to decrease the pain medication and only take it as needed. By the end of 6 weeks, you should be off of pain medications.  o You can decrease your pain medication consumption by slowly spacing out the time in between the medication, and using 650mg Tylenol when the pain is not as severe. Do not exceed 3500mg of Tylenol in 24 hours.   o Refills will not  be given by the office during evening hours, on weekends, or after 6 weeks post-op.  o To seek refills on pain medications during the initial 6 week post-operative period, you must call the office 48 hours in advance to request the refill. The office will then notify you when to  the prescription. DO NOT wait until you are out of the medication to request a refill.

## 2021-07-08 NOTE — THERAPY DISCHARGE NOTE
Patient Name: Caleb Geronimo  : 1970    MRN: [id number]                              Today's Date: 2021       Admit Date: 2021    Visit Dx:     ICD-10-CM ICD-9-CM   1. Tear of gluteus medius tendon, unspecified laterality, initial encounter  S76.019A 843.8   2. Tear of left gluteus medius tendon, initial encounter  S76.012A 843.8   3. Impaired gait and mobility  R26.89 781.2     There is no problem list on file for this patient.    Past Medical History:   Diagnosis Date   • Anxiety and depression    • Hypertension    • Wears eyeglasses      Past Surgical History:   Procedure Laterality Date   • NO PAST SURGERIES       General Information     Row Name 21 1305          Physical Therapy Time and Intention    Document Type  discharge evaluation/summary  -DELMA     Mode of Treatment  individual therapy;physical therapy  -DELMA     Row Name 21 1305          General Information    Patient Profile Reviewed  yes  -DELMA     Prior Level of Function  independent:;all household mobility;transfer;bed mobility;ADL's  -DELMA     Existing Precautions/Restrictions  (S) brace worn when out of bed;other (see comments) TTWB for balance on L LE  -DELMA     Barriers to Rehab  none identified  -DELMA     Row Name 21 1305          Living Environment    Lives With  child(ana), adult;spouse  -DELMA     Row Name 21 1305          Home Main Entrance    Number of Stairs, Main Entrance  one  -DELMA     Stair Railings, Main Entrance  none  -DELMA     Row Name 21 1305          Stairs Within Home, Primary    Stairs, Within Home, Primary  0  -DELMA     Number of Stairs, Within Home, Primary  none  -DELMA     Row Name 21 1305          Cognition    Orientation Status (Cognition)  oriented x 4  -DELMA     Row Name 21 1305          Safety Issues, Functional Mobility    Safety Issues Affecting Function (Mobility)  safety precaution awareness  -DELMA     Impairments Affecting Function (Mobility)  endurance/activity tolerance;range  of motion (ROM)  -       User Key  (r) = Recorded By, (t) = Taken By, (c) = Cosigned By    Initials Name Provider Type    DELMA Ravin Padron, PT Physical Therapist        Mobility     Row Name 07/08/21 1305          Bed Mobility    Bed Mobility  scooting/bridging;supine-sit  -DELMA     Scooting/Bridging [place] (Bed Mobility)  supervision;verbal cues  -DELMA     Supine-Sit [place] (Bed Mobility)  supervision;verbal cues  -DELMA     Assistive Device (Bed Mobility)  bed rails;head of bed elevated  -DELMA     Comment (Bed Mobility)  Verbal cues for LE sequencing off of EOB and trunk control into sitting  -     Row Name 07/08/21 1305          Transfers    Comment (Transfers)  Verbal cues for safe hand placement during standing/sitting and maintaining TTWB at L LE; hip ABD brace already donned upon arrival  -     Row Name 07/08/21 1305          Sit-Stand Transfer    Sit-Stand [place] (Transfers)  verbal cues;standby assist  -     Assistive Device (Sit-Stand Transfers)  walker, front-wheeled  -     Row Name 07/08/21 1305          Gait/Stairs (Locomotion)    [place] Level (Gait)  standby assist;verbal cues;1 person to manage equipment  -     Assistive Device (Gait)  walker, front-wheeled  -DELMA     Distance in Feet (Gait)  150 feet  -DELMA     Deviations/Abnormal Patterns (Gait)  bilateral deviations;jihan decreased;gait speed decreased;stride length decreased  -DELMA     Bilateral Gait Deviations  forward flexed posture  -DELMA     [place] Level (Stairs)  verbal cues;stand by assist  -DELMA     Assistive Device (Stairs)  walker, front-wheeled  -DELMA     Handrail Location (Stairs)  none  -DELMA     Number of Steps (Stairs)  1 backwards technique  -DELMA     Ascending Technique (Stairs)  step-to-step  -DELMA     Descending Technique (Stairs)  step-to-step  -DELMA     Comment (Gait/Stairs)  Pt ambulated with swing through pattern and decreased speed. Verbal cues for R LE sequencing, maintaining upright posture, body within walker,  and maintaining TTWB at L LE. Pt ascended/descended 1 step using RW, backwards technique, and SBA. Gait/stair training limited by fatigue.  -DELMA     Row Name 07/08/21 1305          Mobility    Extremity Weight-bearing Status  left lower extremity  -     Left Lower Extremity (Weight-bearing Status)  weight-bearing as tolerated (WBAT)  -       User Key  (r) = Recorded By, (t) = Taken By, (c) = Cosigned By    Initials Name Provider Type    Ravin Keller PT Physical Therapist        Obj/Interventions     Row Name 07/08/21 1305          Range of Motion Comprehensive    General Range of Motion  lower extremity range of motion deficits identified  -     Comment, General Range of Motion  R LE AROM WFL; L LE AROM impaired 50%; able to actively DF/PF  -DELMA     Row Name 07/08/21 1305          Strength Comprehensive (MMT)    General Manual Muscle Testing (MMT) Assessment  lower extremity strength deficits identified  -     Comment, General Manual Muscle Testing (MMT) Assessment  R LE functionally 4+/5; L LE TTWB;  -DELMA     Row Name 07/08/21 1305          Balance    Balance Assessment  sitting static balance;standing static balance;standing dynamic balance  -     Static Sitting Balance  WFL;unsupported;sitting, edge of bed  -     Static Standing Balance  WFL;supported;standing  -     Dynamic Standing Balance  WFL;supported;standing  -DELMA     Row Name 07/08/21 1305          Sensory Assessment (Somatosensory)    Sensory Assessment (Somatosensory)  LE sensation intact  -       User Key  (r) = Recorded By, (t) = Taken By, (c) = Cosigned By    Initials Name Provider Type    Ravin Keller PT Physical Therapist        Goals/Plan    No documentation.       Clinical Impression     Row Name 07/08/21 1304          Pain    Additional Documentation  Pain Scale: Numbers Pre/Post-Treatment (Group)  -DELMA     Row Name 07/08/21 130          Pain Scale: Numbers Pre/Post-Treatment    Pretreatment Pain Rating  2/10  -DELMA      Posttreatment Pain Rating  2/10  -DELMA     Pain Location - Side  Left  -DELMA     Pain Location - Orientation  generalized  -DELMA     Pain Location  hip  -DELMA     Pain Intervention(s)  Ambulation/increased activity;Repositioned  -DELMA     Row Name 07/08/21 1305          Therapy Assessment/Plan (PT)    Patient/Family Therapy Goals Statement (PT)  To return home  -     Row Name 07/08/21 1305          Positioning and Restraints    Pre-Treatment Position  in bed  -DELMA     Post Treatment Position  bed  -DELMA     In Bed  notified nsg;sitting;sitting EOB;call light within reach;encouraged to call for assist;with nsg  -DELMA       User Key  (r) = Recorded By, (t) = Taken By, (c) = Cosigned By    Initials Name Provider Type    Ravin Keller, PT Physical Therapist        Outcome Measures     Row Name 07/08/21 1305          How much help from another person do you currently need...    Turning from your back to your side while in flat bed without using bedrails?  4  -DELMA     Moving from lying on back to sitting on the side of a flat bed without bedrails?  4  -DELMA     Moving to and from a bed to a chair (including a wheelchair)?  4  -DELMA     Standing up from a chair using your arms (e.g., wheelchair, bedside chair)?  4  -DELMA     Climbing 3-5 steps with a railing?  4  -DELMA     To walk in hospital room?  4  -DELMA     AM-PAC 6 Clicks Score (PT)  24  -DELMA     Row Name 07/08/21 1305          Functional Assessment    Outcome Measure Options  AM-PAC 6 Clicks Basic Mobility (PT)  -       User Key  (r) = Recorded By, (t) = Taken By, (c) = Cosigned By    Initials Name Provider Type    Ravin Keller, PT Physical Therapist        Physical Therapy Education                 Title: PT OT SLP Therapies (Done)     Topic: Physical Therapy (Done)     Point: Mobility training (Done)     Learning Progress Summary           Patient Acceptance, E,D, VU by DELMA at 7/8/2021 1305    Comment: Educated on safe sequencing with bed mobility, ambulatory/car transfers, gait, and  stair training. Reviewed TTWB precautions.                   Point: Home exercise program (Done)     Learning Progress Summary           Patient Acceptance, E,D, VU by DELMA at 7/8/2021 1305    Comment: Educated on safe sequencing with bed mobility, ambulatory/car transfers, gait, and stair training. Reviewed TTWB precautions.                   Point: Body mechanics (Done)     Learning Progress Summary           Patient Acceptance, E,D, VU by DELMA at 7/8/2021 1305    Comment: Educated on safe sequencing with bed mobility, ambulatory/car transfers, gait, and stair training. Reviewed TTWB precautions.                   Point: Precautions (Done)     Learning Progress Summary           Patient Acceptance, E,D, VU by DELMA at 7/8/2021 1305    Comment: Educated on safe sequencing with bed mobility, ambulatory/car transfers, gait, and stair training. Reviewed TTWB precautions.                               User Key     Initials Effective Dates Name Provider Type Discipline     06/16/21 -  Ravin Padron, PT Physical Therapist PT              PT Recommendation and Plan     Plan of Care Reviewed With: patient  Progress: improving  Outcome Summary: PT eval complete. Pt ambulated 150 feet using RW, SBA, and one person to manage equipment. Pt ascended/descended 1 step using RW, backwards technique, and SBA. Gait/stair training limited by fatigue. Bed mobility performed with supervision and STS with SBA. Pt able to maintain TTWB precautions at L LE throughout mobility. Reviewed WB precautions. Functionally, pt safe to d/c home with assist today from a PT perspective.     Time Calculation:   PT Charges     Row Name 07/08/21 1305             Time Calculation    Start Time  1305  -DELMA      PT Received On  07/08/21  -DELMA      PT Goal Re-Cert Due Date  07/18/21  -DELMA         Untimed Charges    PT Eval/Re-eval Minutes  46  -DELMA         Total Minutes    Untimed Charges Total Minutes  46  -DELMA       Total Minutes  46  -DELMA        User Key  (r) =  Recorded By, (t) = Taken By, (c) = Cosigned By    Initials Name Provider Type    DELMA Ravin Padron, PT Physical Therapist        Therapy Charges for Today     Code Description Service Date Service Provider Modifiers Qty    07162234932 HC PT EVAL LOW COMPLEXITY 4 7/8/2021 Ravin aPdron, PT GP 1    30687471067 HC PT THER SUPP EA 15 MIN 7/8/2021 Ravin Padron, PT GP 2          PT G-Codes  Outcome Measure Options: AM-PAC 6 Clicks Basic Mobility (PT)  AM-PAC 6 Clicks Score (PT): 24    PT Discharge Summary  Anticipated Discharge Disposition (PT): home with assist  Reason for Discharge: Discharge from facility  Outcomes Achieved: Discharge from facility occurred on same date as evluation  Discharge Destination: Home with assist    Ravin Padron, PT  7/8/2021

## 2021-07-08 NOTE — OP NOTE
HIP ADDUCTOR REPAIR  Procedure Report    Patient Name:  Claeb Geronimo  YOB: 1970    Date of Surgery:  7/8/2021     Indications:    51 y.o. male who was admitted to [facility] on a progressive management of nonoperative treatment of their hip tendinitis and tear of the glue medius tendon. Unfortunately patient progressed with significant pain and difficulty with ambulation and daily function.  The patient was indicated for a left hip glue medius repair. Likely risk benefits of the procedure including but not limited to infection, DVT, pulmonary embolism,, possibility of injury to nerves and vessels, potential recurrent tear have been discussed with the patient and wife in detail. Despite the risks involved the patient elected to proceed with an informed consent was obtained.     Patient Identification:  Patient was seen in pre-op, consent was reviewed, operative procedure was identified, surgical site and thigh marked.    Complexity Modifier:   Due to the patients BMI of 37, the surgery required additional surgical dissection, assistance with retraction, and increased surgical exposure in order to perform the total joint replacement. This required additional assistance in the operating room, unique instrumentation and techniques, increased time, increased risk, which all made for a more complex and difficult joint replacement procedure. This was due to the increased body mass index and obesity in addition to the severity of the arthritis. Therefore, a Modifier 22 is being utilized.      Pre-op Diagnosis:   Tendinitis involving left hip abductors [1362621]       Post-Op Diagnosis Codes:     * Tendinitis involving left hip abductors [M76.892]    Procedure/CPT® Codes:      Procedure(s):  HIP ABDUCTOR REPAIR LEFT    Staff:  Surgeon(s):  Kolton Kurtz MD    Anesthesia: General    Estimated Blood Loss: 100ml    Implants:    Implant Name Type Inv. Item Serial No.   Lot No. LRB No. Used Action   SUT FW #2 W/TPR NDL 1/2 CIR 38IN 97CM 26.5MM ARTEM - [id number] Implant SUT FW #2 W/TPR NDL 1/2 CIR 38IN 97CM 26.5MM ARTEM  [employer]  Left 1 Implanted   SUT CONTRL TISS STRATAFIX SPIRAL PDO BIDIR 1 [id number] - [id number] Implant SUT CONTRL TISS STRATAFIX SPIRAL PDO BIDIR 1 [id number]  [employer]  DIV OF J AND J [id number] Left 1 Implanted   SYS FIX ACL SWIVELOCK 2NDARY 4.75X19.1MM - [id number] Implant SYS FIX ACL SWIVELOCK 2NDARY 4.75X19.1MM  [employer] [id number] Left 1 Implanted   SYS FIX ACL SWIVELOCK 2NDARY 4.75X19.1MM - [id number] Implant SYS FIX ACL SWIVELOCK 2NDARY 4.75X19.1MM  [employer] [id number] Left 2 Implanted   TISS PTCH DECEL/DERM ARTHROFLEX [id number] - [id number] Implant TISS PTCH DECEL/DERM ARTHROFLEX [id number]  [facility] [id number] Left 1 Implanted   SUT FIBERWIRE W/NDL 2/STRND TPR 26.5MM 38IN - [id number] Implant SUT FIBERWIRE W/NDL 2/STRND TPR 26.5MM 38IN  [employer]  Left 1 Implanted       Specimen:          None      Findings: Tearing and fraying of the glue medius tendon in the central portion with some overlying scar tissue trochanter was not completely bald    Complications: None    Description of Procedure: The patient was transferred to [facility] operating room, preoperative antibiotics in form of Kefzol 2gm Given IV Prior to Skin Incision As Well As 1 G of Tranexemic Acid. Patient Received Generalanesthesia and was transferred to the regular OR table with pegboard placed in lateral decubitus position with an axillary roll. All bony prominences were padded adequately. The operative hip was then prepped and draped in the usual sterile fashion. Multiple timeouts were done identifying the correct patient, surgical site, and planned procedure.    A skin incision was made overlying the lateral aspect of the hip down through skin subcutaneous tissue and to the tensor fascia which was sharply incised and the Glumac's muscle bluntly split proximally revealing  the greater trochanter with glue medius tendon had some obvious tearing or fraying especially in the central portion this was debrided back we then used a high-speed bur to.  Create a nice bone bed for the tendon to be repaired to with a good bleeding bone.  The hip was then brought into a slightly flexed and AB ducted position in order to take the tension off the glue medius tendon.  We then placed a FiberWire stitching into the glue medius tendon in a [place] fashion these 2 separate ones 1 anterior one posterior and had good purchase into the tendon we then used swivel lock suture anchor to anchor this down into the greater trochanter bone.  I then placed a graft over the top of this repair with the extra suture from the swivel lock suture intact that down over the top and incorporated into the repair.  The hip was then taken through range of motion and the repair was found to be stable.        the wound was then thoroughly irrigated with saline. We did use more of the injection cocktail. Betadine irrigation was used followed by 3L of saline irrigation. Soft tissue hemostasis was secured and second dose of IV TXA was used. Sponge, needle, and instrument counts were correct.  We then closed the fascial layer with a running #2 STRATAFIX followed by 2-0 Vicryl and then Monocryl for the skin and Dermabond . Once the Dermabond had dried, sterile dressing was placed over the top. The patient was then transferred to the recovery room in stable condition. Patient tolerated the procedure well. There were no medications. The patient had adequate distal pulses and good cap refill.    The patient will be mobilized by physical therapy.  Patient will be toe-touch weightbearing with hip abduction brace in the left lower extremity for 6 weeks. The patient was started on DVT prophylaxis with 81 mg aspirin twice daily on starting postoperative day #1.    I discussed the satisfactory performance of the procedure with the patient's  family and discussed the postoperative management.      Assistant Participation:  Surgeon(s):  Kolton Kurtz MD    Assistant: Melida Blandon PA-C assisted with proper preoperative positioning, preoperative templating, determingin availability of proper implants, prepping and draping of patient, manipulation placement of instruments, protection of ligaments and vital soft tissue structures, assistance in maintaining hemostasis and assistance with closure of the wound. Their skills and knowledge of the steps of operation and the desired outcome of each surgical step was crucial, allowing for efficient choreography of surgical procedure, and closure of the wound which lead to reduced surgical time, less blood loss, and less risk of complications for the patient.       Kolton Kurtz MD     Date: 7/8/2021  Time: 10:27 EDT

## 2021-07-09 LAB
COTININE SERPL-MCNC: <1 NG/ML
NICOTINE SERPL-MCNC: <1 NG/ML

## (undated) DEVICE — ADHS SKIN PREMIERPRO EXOFIN TOPICAL HI/VISC .5ML

## (undated) DEVICE — UNDERGLV SURG BIOGEL INDICAT PI SZ8.5 BLU

## (undated) DEVICE — SST TWIST DRILL, STANDARD, 2.4MM DIA. X 127MM: Brand: MICROAIRE®

## (undated) DEVICE — ELECTRD BLD EZ CLN STD 2.5IN

## (undated) DEVICE — DRSNG WND STRIP OPTIFOAM AG SUPRABS A/MIC 4X8IN STRL

## (undated) DEVICE — PULLOVER TOGA, 2X LARGE: Brand: FLYTE, SURGICOOL

## (undated) DEVICE — HEWSON SUTURE RETRIEVER: Brand: HEWSON SUTURE RETRIEVER

## (undated) DEVICE — PEG PAD FOR SURG DISP

## (undated) DEVICE — GLV SURG SENSICARE PI ORTHO SZ8.5 LF STRL

## (undated) DEVICE — MARKER,SKIN,W/RULER,DUAL,STOP: Brand: MEDLINE

## (undated) DEVICE — SWABSTK SKINPREP PVPI PRE/SAT 8IN STRL

## (undated) DEVICE — SUT MNCRYL PLS ANTIB UD 3/0 PS2 27IN

## (undated) DEVICE — PENCL ROCKRSWCH MEGADYNE W/HOLSTR 10FT SS

## (undated) DEVICE — ELECTRD BLD EZ CLN STD 6.5IN

## (undated) DEVICE — 3M™ STERI-STRIP™ REINFORCED ADHESIVE SKIN CLOSURES, R1547, 1/2 IN X 4 IN (12 MM X 100 MM), 6 STRIPS/ENVELOPE: Brand: 3M™ STERI-STRIP™

## (undated) DEVICE — BLANKT WARM UPPR/BDY ARM/OUT 57X196CM

## (undated) DEVICE — NDL REV W/NITNL LP C13 .5CIR 36.6MM

## (undated) DEVICE — GOWN,NON-REINFORCED,SIRUS,SET IN SLV,XL: Brand: MEDLINE

## (undated) DEVICE — 1010 S-DRAPE TOWEL DRAPE 10/BX: Brand: STERI-DRAPE™

## (undated) DEVICE — 5.5MM OVAL SOLID CARBIDE BUR MEDIUM

## (undated) DEVICE — NDL HYPO PRECISIONGLIDE REG 22G 1 1/2

## (undated) DEVICE — SYR CONTRL PRESS/LO FIX/M/LL W/THMB/RNG 10ML

## (undated) DEVICE — GOWN,PREVENTION PLUS,XXLARGE,STERILE: Brand: MEDLINE

## (undated) DEVICE — ISO/ALC 70PCT 16OZ

## (undated) DEVICE — PREMIUM DRY TRAY LF: Brand: MEDLINE INDUSTRIES, INC.

## (undated) DEVICE — SCRB SURG BACTOSHIELD CHG 4PCT 4OZ

## (undated) DEVICE — PK HIP TOTL UNIV 10

## (undated) DEVICE — PATIENT RETURN ELECTRODE, SINGLE-USE, CONTACT QUALITY MONITORING, ADULT, WITH 9FT CORD, FOR PATIENTS WEIGING OVER 33LBS. (15KG): Brand: MEGADYNE

## (undated) DEVICE — ANTIBACTERIAL UNDYED BRAIDED (POLYGLACTIN 910), SYNTHETIC ABSORBABLE SUTURE: Brand: COATED VICRYL

## (undated) DEVICE — DRP SURG UNIV BASIC BILAMINATE 53X77IN DISP